# Patient Record
Sex: FEMALE | Race: WHITE
[De-identification: names, ages, dates, MRNs, and addresses within clinical notes are randomized per-mention and may not be internally consistent; named-entity substitution may affect disease eponyms.]

---

## 2020-07-16 ENCOUNTER — HOSPITAL ENCOUNTER (INPATIENT)
Dept: HOSPITAL 41 - JD.OB | Age: 25
LOS: 3 days | Discharge: HOME | DRG: 560 | End: 2020-07-19
Attending: OBSTETRICS & GYNECOLOGY | Admitting: OBSTETRICS & GYNECOLOGY
Payer: COMMERCIAL

## 2020-07-16 DIAGNOSIS — Z3A.37: ICD-10-CM

## 2020-07-16 DIAGNOSIS — Z11.59: ICD-10-CM

## 2020-07-16 DIAGNOSIS — Z88.0: ICD-10-CM

## 2020-07-16 DIAGNOSIS — O34.219: ICD-10-CM

## 2020-07-16 PROCEDURE — U0002 COVID-19 LAB TEST NON-CDC: HCPCS

## 2020-07-16 RX ADMIN — Medication SCH MLS/HR: at 20:56

## 2020-07-16 NOTE — PCM.LDHP
L&D History of Present Illness





- General


Date of Service: 20


Admit Problem/Dx: 


                   Patient Status Order with Admit Dx/Problem





20 19:16


Patient Status [ADT] Routine 








                           Admission Diagnosis/Problem











Admission Diagnosis/Problem    Pregnancy














Source of Information: Patient


History Limitations: Reports: No Limitations





- History of Present Illness


Introduction:: 





Patient is a 23 y/o  at 37 3/7 wks who presents for IOL for gestational 

HTN.  Last pregnancy affected by IUGR and severe preeclampsia.  Had a 36 wk IOL 

where did not progress past 1 cm and ended in PLTCS.  Growth scans normal this 

pregnancy, but at 36 wks noted to have intermittent mild range BP's.  Labs 

normal and so set for IOL/TOLAC today.  Doing well.  Having some contractions.  

No bleeding or LOF.  








- Related Data


Allergies/Adverse Reactions: 


                                    Allergies











Allergy/AdvReac Type Severity Reaction Status Date / Time


 


Penicillins Allergy  Other Verified 20 18:06











Home Medications: 


                                    Home Meds





Albuterol [Proair HFA] 2 puff INH ASDIRECTED 20 [History]


Prenatal Vits #93/Iron Fum/FA [Prenatal Formula Tablet] 1 each PO DAILY 20

[History]


Ferrous Sulfate [Iron] 325 mg PO DAILY 20 [History]


Nitrofurantoin Monohyd/M-Cryst [Macrobid 100 mg Capsule] 100 mg PO Q12HR 0

20 [History]











Past Medical History


Respiratory History: Reports: Asthma


OB/GYN History: Reports: Pregnancy


: 2


Para: 1


LMP (Approximate): Pregnant





- Past Surgical History


HEENT Surgical History: Reports: Oral Surgery


Female  Surgical History: Reports:  Section, Cervical Cryotherapy





Social & Family History





- Tobacco Use


Smoking Status *Q: Never Smoker





- Alcohol Use


Alcohol Use History: No





- Recreational Drug Use


Recreational Drug Use: No





H&P Review of Systems





- Review of Systems:


Review Of Systems: See Below


General: Reports: No Symptoms


Pulmonary: Reports: No Symptoms


Cardiovascular: Reports: No Symptoms


Gastrointestinal: Reports: Abdominal Pain (rare contractions )


Genitourinary: Reports: No Symptoms


Musculoskeletal: Reports: No Symptoms


Psychiatric: Reports: No Symptoms


Neurological: Reports: No Symptoms





L&D Exam





- Exam


Exam: See Below





- Vital Signs


Weight: 90.9 kg





- OB Specific


Contraction Intensity: Mild


Fetal Movement: Active


Fetal Heart Tones: Present


Fetal Heart Tones per Min: 135


Fetal Heart Rate (FHR) Variability: Moderate (6-25 bmp)


Birth Presentation: Vertex





- Wagner Score


Wagner Score Cervix Position: Midposition


Wagner Score Consistency: Soft


Wagner Score Effacement: 51-70%


Wagner Score Dilation: 1-2 cm


Wagner Score Infant's Station: -2


Wagner Score Total: 7





- Exam


General: Alert, Oriented, Cooperative


Lungs: Clear to Auscultation, Normal Respiratory Effort


Cardiovascular: Regular Rate, Regular Rhythm


GI/Abdominal Exam: Soft, Non-Tender


Genitourinary: Normal external exam


Extremities: Normal Inspection


Skin: Warm, Dry, Intact





- Patient Data


Lab Results Last 24 hrs: 


                         Laboratory Results - last 24 hr











  20 Range/Units





  19:35 


 


WBC  8.53  (3.98-10.04)  K/mm3


 


RBC  4.14  (3.98-5.22)  M/mm3


 


Hgb  11.3  (11.2-15.7)  gm/dl


 


Hct  35.5  (34.1-44.9)  %


 


MCV  85.7  (79.4-94.8)  fl


 


MCH  27.3  (25.6-32.2)  pg


 


MCHC  31.8 L  (32.2-35.5)  g/dl


 


RDW Std Deviation  55.1 H  (36.4-46.3)  fL


 


Plt Count  254  (182-369)  K/mm3


 


MPV  9.4  (9.4-12.3)  fl


 


Neut % (Auto)  76.2 H  (34.0-71.1)  %


 


Lymph % (Auto)  14.8 L  (19.3-51.7)  %


 


Mono % (Auto)  7.9  (4.7-12.5)  %


 


Eos % (Auto)  0.6 L  (0.7-5.8)  


 


Baso % (Auto)  0.1  (0.1-1.2)  %


 


Neut # (Auto)  6.51 H  (1.56-6.13)  K/mm3


 


Lymph # (Auto)  1.26  (1.18-3.74)  K/mm3


 


Mono # (Auto)  0.67 H  (0.24-0.36)  K/mm3


 


Eos # (Auto)  0.05  (0.04-0.36)  K/mm3


 


Baso # (Auto)  0.01  (0.01-0.08)  K/mm3











Result Diagrams: 


                                 20 19:35








- Problem List


(1) 37 weeks gestation of pregnancy


SNOMED Code(s): 04000808


   ICD Code: Z3A.37 - 37 WEEKS GESTATION OF PREGNANCY   Status: Acute   Current 

Visit: Yes   





(2) Gestational hypertension


SNOMED Code(s): 653231482


   ICD Code: O13.9 - GESTATIONAL HTN W/O SIGNIFICANT PROTEINURIA, UNSP TRIMESTER

   Status: Acute   Current Visit: Yes   


Qualifiers: 


   Trimester: third trimester   Qualified Code(s): O13.3 - Gestational 

[pregnancy-induced] hypertension without significant proteinuria, third 

trimester   





(3) Gestational diabetes, diet controlled


SNOMED Code(s): 47435093, 606227691, 972012200


   ICD Code: O24.410 - GESTATIONAL DIABETES MELLITUS IN PREGNANCY, DIET CONTROLL

ED   Status: Acute   Current Visit: Yes   


Qualifiers: 


   Trimester: third trimester   Qualified Code(s): O24.410 - Gestational 

diabetes mellitus in pregnancy, diet controlled   





(4) History of  delivery


SNOMED Code(s): 925904771


   ICD Code: Z98.891 - HISTORY OF UTERINE SCAR FROM PREVIOUS SURGERY   Status: 

Acute   Current Visit: Yes   


Problem List Initiated/Reviewed/Updated: Yes


Orders Last 24hrs: 


                               Active Orders 24 hr











 Category Date Time Status


 


 Patient Status [ADT] Routine ADT  20 19:16 Active


 


 Activity as Tolerated [RC] PFP Care  20 19:16 Active


 


 Communication Order [RC] ASDIRECTED Care  20 19:16 Active


 


 Fetal Heart Tones [RC] ASDIRECTED Care  20 19:17 Active


 


 Fetal Non Stress Test [RC] PER UNIT ROUTINE Care  20 19:16 Active


 


 Notify Provider [RC] PFP Care  20 19:16 Active


 


 Notify Provider [RC] PRN Care  20 19:16 Active


 


 Peripheral IV Care [RC] .AS DIRECTED Care  20 19:17 Active


 


 Vital Signs [RC] PER UNIT ROUTINE Care  20 19:16 Active


 


 Regular Diet [DIET] Diet  20 Breakfast Active


 


 ALANINE AMINOTRANSFERASE,ALT [CHEM] Routine Lab  20 19:48 Ordered


 


 ASPARTATE AMNIOTRANSFERASE,AST [CHEM] Routine Lab  20 19:48 Ordered


 


 CREATININE W/GFR [CHEM] Routine Lab  20 19:48 Ordered


 


 PROTEIN/CREATININE RATIO,URINE [URCHEM] Routine Lab  20 19:48 Ordered


 


 RAPID PLASMA REAGIN,RPR [CHEM] Routine Lab  20 19:35 Received


 


 TYPE AND SCREEN [BBK] Routine Lab  20 19:48 Ordered


 


 Acetaminophen [Tylenol] Med  20 19:16 Active





 650 mg PO Q4H PRN   


 


 Calcium Carbonate [Tums] Med  20 19:16 Active





 1,000 mg PO Q2H PRN   


 


 Lactated Ringers [Ringers, Lactated] 1,000 ml Med  20 19:30 Active





 IV ASDIRECTED   


 


 Nalbuphine [Nubain] Med  20 19:16 Active





 10 mg IVPUSH Q2H PRN   


 


 Ondansetron [Zofran] Med  20 19:16 Active





 4 mg IVPUSH Q4H PRN   


 


 Oxytocin/Lactated Ringers [Pitocin in LR 10 Units/1,000 Med  20 19:30 

Active





 ML]   





 10 unit in 1,000 ml IV .CONTINUOUS   


 


 Oxytocin/Lactated Ringers [Pitocin in LR 10 Units/1,000 Med  20 19:30 

Active





 ML]   





 10 unit in 1,000 ml IV TITRATE   


 


 Sodium Chloride 0.9% [Saline Flush] Med  20 19:16 Active





 10 ml FLUSH ASDIRECTED PRN   


 


 Electronic Fetal Heart Tones Ext w TOCO [WOMSER] Oth  20 19:16 Ordered





 Routine   


 


 Electronic Fetal Heart Tones Internal [WOMSER] Per Unit Ot  20 19:16 

Ordered





 Routine   


 


 Peripheral IV Insertion Adult [OM.PC] Routine Oth  20 19:16 Ordered


 


 Resuscitation Status Routine Resus Stat  20 19:16 Ordered








                                Medication Orders





Acetaminophen (Tylenol)  650 mg PO Q4H PRN


   PRN Reason: Pain (Mild 1-3) and fever


Calcium Carbonate/Glycine (Tums)  1,000 mg PO Q2H PRN


   PRN Reason: Indigestion


Oxytocin/Lactated Ringer's (Pitocin In Lr 10 Units/1,000 Ml)  10 unit in 1,000 

mls @ 12 mls/hr IV TITRATE HEAVEN; Protocol


Oxytocin/Lactated Ringer's (Pitocin In Lr 10 Units/1,000 Ml)  10 unit in 1,000 

mls @ 500 mls/hr IV .CONTINUOUS HEAVEN


Lactated Ringer's (Ringers, Lactated)  1,000 mls @ 100 mls/hr IV ASDIRECTED HEAVEN


Nalbuphine HCl (Nubain)  10 mg IVPUSH Q2H PRN


   PRN Reason: Pain


Ondansetron HCl (Zofran)  4 mg IVPUSH Q4H PRN


   PRN Reason: Nausea/Vomiting


Sodium Chloride (Saline Flush)  10 ml FLUSH ASDIRECTED PRN


   PRN Reason: Keep Vein Open








Assessment/Plan Comment:: 





Labs including AST, ALT, Creatinine, urine protein to creatinine ratio to be 

done


Blood sugars q4 in early labor, q2 in active 


Reis bulb placed.  Pitocin to be started.  AROM when able 


GBS negative, no need for antibiotics 


Pain management per patient preference 


Anticipate

## 2020-07-17 PROCEDURE — 0KQM0ZZ REPAIR PERINEUM MUSCLE, OPEN APPROACH: ICD-10-PCS | Performed by: OBSTETRICS & GYNECOLOGY

## 2020-07-17 PROCEDURE — 3E0R3BZ INTRODUCTION OF ANESTHETIC AGENT INTO SPINAL CANAL, PERCUTANEOUS APPROACH: ICD-10-PCS | Performed by: OBSTETRICS & GYNECOLOGY

## 2020-07-17 PROCEDURE — 00HU33Z INSERTION OF INFUSION DEVICE INTO SPINAL CANAL, PERCUTANEOUS APPROACH: ICD-10-PCS | Performed by: OBSTETRICS & GYNECOLOGY

## 2020-07-17 PROCEDURE — 10H07YZ INSERTION OF OTHER DEVICE INTO PRODUCTS OF CONCEPTION, VIA NATURAL OR ARTIFICIAL OPENING: ICD-10-PCS | Performed by: OBSTETRICS & GYNECOLOGY

## 2020-07-17 PROCEDURE — 10907ZC DRAINAGE OF AMNIOTIC FLUID, THERAPEUTIC FROM PRODUCTS OF CONCEPTION, VIA NATURAL OR ARTIFICIAL OPENING: ICD-10-PCS | Performed by: OBSTETRICS & GYNECOLOGY

## 2020-07-17 RX ADMIN — Medication PRN ML: at 03:10

## 2020-07-17 RX ADMIN — Medication PRN ML: at 15:14

## 2020-07-17 RX ADMIN — WITCH HAZEL PRN TUB: 500 SOLUTION RECTAL; TOPICAL at 17:25

## 2020-07-17 RX ADMIN — Medication SCH MLS/HR: at 14:29

## 2020-07-17 RX ADMIN — Medication PRN ML: at 09:18

## 2020-07-17 NOTE — PCM.DEL
L & D Note





- General Info


Date of Service: 20





- Delivery Note


Labor: Induced by ARM, Induced by Oxytocin


Cervical Ripening Method: Balloon Device


Delivery Outcome: Livebirth


Infant Delivery Method: Spontaneous Vaginal Delivery-Single


Infant Delivery Mode: Spontaneous


Birth Presentation: Right Occiput Anterior (YOVANI)


Nuchal Cord: None


Anesthesia Type: Epidural


Amniotic Fluid Description: Clear


Episiotomy Type: None


Laceration: 2nd Degree, Sulcus


Suture type: Vicryl


Suture size: 2-0


Placenta: Intact, Spontaneous


Cord: 3 Vessels


Estimated Blood Loss: 400


Resuscitation Needed: Yes


: Bulb Syringe, Stimulated, Warmed, Montgomery Used, Warmer Used


Delivery Comments (Free Text/Narrative):: 





Patient found to be complete and began pushing.  With maternal pushing effort 

fetal head delivered from YOVANI presentation.  No nuchal cord present.  With 

gentle downward traction the fetal shoulders and body delivered.  Infant placed 

on maternal abdomen.  Cord clamped and cut.  Cord blood obtained.  Placenta 

allowed time to separate and expelled intact.  Very brisk bleeding noted 

throughout this time which was found to be a deep sulcus tear on patient's 

right.  Suction used to visualize site and hemostasis obtained with running, 

locked 2-0 vicryl suture.  Next small perineal laceration closed with a running 

2-0 vicryl as was a split in the right labia.  





- General Info


Date of Service: 20





- Patient Data


Vitals - Most Recent: 


                                Last Vital Signs











Temp  36.7 C   20 19:16


 


Pulse  113 H  20 19:16


 


Resp  14   20 19:16


 


BP  138/100 H  20 19:16


 


Pulse Ox  97   20 19:16











Weight - Most Recent: 90.9 kg


I&O - Last 24 Hours: 


                                 Intake & Output











 20





 06:59 14:59 22:59


 


Intake Total 1000 1000 


 


Balance 1000 1000 











Lab Results Last 24 Hours: 


                         Laboratory Results - last 24 hr











  20 Range/Units





  19:35 19:35 19:35 


 


WBC  8.53    (3.98-10.04)  K/mm3


 


RBC  4.14    (3.98-5.22)  M/mm3


 


Hgb  11.3    (11.2-15.7)  gm/dl


 


Hct  35.5    (34.1-44.9)  %


 


MCV  85.7    (79.4-94.8)  fl


 


MCH  27.3    (25.6-32.2)  pg


 


MCHC  31.8 L    (32.2-35.5)  g/dl


 


RDW Std Deviation  55.1 H    (36.4-46.3)  fL


 


Plt Count  254    (182-369)  K/mm3


 


MPV  9.4    (9.4-12.3)  fl


 


Neut % (Auto)  76.2 H    (34.0-71.1)  %


 


Lymph % (Auto)  14.8 L    (19.3-51.7)  %


 


Mono % (Auto)  7.9    (4.7-12.5)  %


 


Eos % (Auto)  0.6 L    (0.7-5.8)  


 


Baso % (Auto)  0.1    (0.1-1.2)  %


 


Neut # (Auto)  6.51 H    (1.56-6.13)  K/mm3


 


Lymph # (Auto)  1.26    (1.18-3.74)  K/mm3


 


Mono # (Auto)  0.67 H    (0.24-0.36)  K/mm3


 


Eos # (Auto)  0.05    (0.04-0.36)  K/mm3


 


Baso # (Auto)  0.01    (0.01-0.08)  K/mm3


 


Creatinine   0.7   (0.55-1.02)  mg/dL


 


Est Cr Clr Drug Dosing   110.55   mL/min


 


Estimated GFR (MDRD)   > 60   (>60)  mL/min


 


POC Glucose     ()  mg/dL


 


AST   26   (15-37)  U/L


 


ALT   25   (14-59)  U/L


 


Ur Random Creatinine     (30.0-125.0)  mg/dL


 


U Random Total Protein     (0.0-11.8)  mg/dL


 


Protein/Creatinin Ratio     (0-149)  mg/g


 


COVID-19 ([facility])     (NEGATIVE)  


 


Blood Type    A POSITIVE  


 


Gel Antibody Screen    Negative  














  20 Range/Units





  21:05 21:12 23:00 


 


WBC     (3.98-10.04)  K/mm3


 


RBC     (3.98-5.22)  M/mm3


 


Hgb     (11.2-15.7)  gm/dl


 


Hct     (34.1-44.9)  %


 


MCV     (79.4-94.8)  fl


 


MCH     (25.6-32.2)  pg


 


MCHC     (32.2-35.5)  g/dl


 


RDW Std Deviation     (36.4-46.3)  fL


 


Plt Count     (182-369)  K/mm3


 


MPV     (9.4-12.3)  fl


 


Neut % (Auto)     (34.0-71.1)  %


 


Lymph % (Auto)     (19.3-51.7)  %


 


Mono % (Auto)     (4.7-12.5)  %


 


Eos % (Auto)     (0.7-5.8)  


 


Baso % (Auto)     (0.1-1.2)  %


 


Neut # (Auto)     (1.56-6.13)  K/mm3


 


Lymph # (Auto)     (1.18-3.74)  K/mm3


 


Mono # (Auto)     (0.24-0.36)  K/mm3


 


Eos # (Auto)     (0.04-0.36)  K/mm3


 


Baso # (Auto)     (0.01-0.08)  K/mm3


 


Creatinine     (0.55-1.02)  mg/dL


 


Est Cr Clr Drug Dosing     mL/min


 


Estimated GFR (MDRD)     (>60)  mL/min


 


POC Glucose   76   ()  mg/dL


 


AST     (15-37)  U/L


 


ALT     (14-59)  U/L


 


Ur Random Creatinine    187.8 H  (30.0-125.0)  mg/dL


 


U Random Total Protein    20.6 H  (0.0-11.8)  mg/dL


 


Protein/Creatinin Ratio    109.7  (0-149)  mg/g


 


COVID-19 ([facility])  Negative    (NEGATIVE)  


 


Blood Type     


 


Gel Antibody Screen     














  20 Range/Units





  00:02 03:37 08:36 


 


WBC     (3.98-10.04)  K/mm3


 


RBC     (3.98-5.22)  M/mm3


 


Hgb     (11.2-15.7)  gm/dl


 


Hct     (34.1-44.9)  %


 


MCV     (79.4-94.8)  fl


 


MCH     (25.6-32.2)  pg


 


MCHC     (32.2-35.5)  g/dl


 


RDW Std Deviation     (36.4-46.3)  fL


 


Plt Count     (182-369)  K/mm3


 


MPV     (9.4-12.3)  fl


 


Neut % (Auto)     (34.0-71.1)  %


 


Lymph % (Auto)     (19.3-51.7)  %


 


Mono % (Auto)     (4.7-12.5)  %


 


Eos % (Auto)     (0.7-5.8)  


 


Baso % (Auto)     (0.1-1.2)  %


 


Neut # (Auto)     (1.56-6.13)  K/mm3


 


Lymph # (Auto)     (1.18-3.74)  K/mm3


 


Mono # (Auto)     (0.24-0.36)  K/mm3


 


Eos # (Auto)     (0.04-0.36)  K/mm3


 


Baso # (Auto)     (0.01-0.08)  K/mm3


 


Creatinine     (0.55-1.02)  mg/dL


 


Est Cr Clr Drug Dosing     mL/min


 


Estimated GFR (MDRD)     (>60)  mL/min


 


POC Glucose  80  84  80  ()  mg/dL


 


AST     (15-37)  U/L


 


ALT     (14-59)  U/L


 


Ur Random Creatinine     (30.0-125.0)  mg/dL


 


U Random Total Protein     (0.0-11.8)  mg/dL


 


Protein/Creatinin Ratio     (0-149)  mg/g


 


COVID-19 ([facility])     (NEGATIVE)  


 


Blood Type     


 


Gel Antibody Screen     














  20 Range/Units





  13:09 


 


WBC   (3.98-10.04)  K/mm3


 


RBC   (3.98-5.22)  M/mm3


 


Hgb   (11.2-15.7)  gm/dl


 


Hct   (34.1-44.9)  %


 


MCV   (79.4-94.8)  fl


 


MCH   (25.6-32.2)  pg


 


MCHC   (32.2-35.5)  g/dl


 


RDW Std Deviation   (36.4-46.3)  fL


 


Plt Count   (182-369)  K/mm3


 


MPV   (9.4-12.3)  fl


 


Neut % (Auto)   (34.0-71.1)  %


 


Lymph % (Auto)   (19.3-51.7)  %


 


Mono % (Auto)   (4.7-12.5)  %


 


Eos % (Auto)   (0.7-5.8)  


 


Baso % (Auto)   (0.1-1.2)  %


 


Neut # (Auto)   (1.56-6.13)  K/mm3


 


Lymph # (Auto)   (1.18-3.74)  K/mm3


 


Mono # (Auto)   (0.24-0.36)  K/mm3


 


Eos # (Auto)   (0.04-0.36)  K/mm3


 


Baso # (Auto)   (0.01-0.08)  K/mm3


 


Creatinine   (0.55-1.02)  mg/dL


 


Est Cr Clr Drug Dosing   mL/min


 


Estimated GFR (MDRD)   (>60)  mL/min


 


POC Glucose  103  ()  mg/dL


 


AST   (15-37)  U/L


 


ALT   (14-59)  U/L


 


Ur Random Creatinine   (30.0-125.0)  mg/dL


 


U Random Total Protein   (0.0-11.8)  mg/dL


 


Protein/Creatinin Ratio   (0-149)  mg/g


 


COVID-19 ([facility])   (NEGATIVE)  


 


Blood Type   


 


Gel Antibody Screen   











Med Orders - Current: 


                               Current Medications





Acetaminophen (Tylenol)  650 mg PO Q4H PRN


   PRN Reason: Pain (Mild 1-3) and fever


Calcium Carbonate/Glycine (Tums)  1,000 mg PO Q2H PRN


   PRN Reason: Indigestion


   Last Admin: 20 14:27 Dose:  1,000 mg


   Documented by: 


Diphenhydramine HCl (Benadryl)  25 mg IVPUSH Q6H PRN


   PRN Reason: pruritis


Ephedrine Sulfate (Ephedrine Sulfate)  5 mg IVPUSH ASDIRECTED PRN


   PRN Reason: Hypotension


Fentanyl (Sublimaze)  100 mcg EPIDUR Q3H PRN


   PRN Reason: Pain


   Last Admin: 20 03:10 Dose:  100 mcg


   Documented by: 


Fentanyl/Bupivacaine HCl (Fentanyl/Bupivacaine/Ns 2 Mcg-0.125% 100 Ml)  100 ml 

EPIDUR ASDIRECTED PRN


   PRN Reason: Pain


   Last Admin: 20 15:14 Dose:  100 ml


   Documented by: 


Oxytocin/Lactated Ringer's (Pitocin In Lr 10 Units/1,000 Ml)  10 unit in 1,000 

mls @ 12 mls/hr IV TITRATE HEAVEN; Protocol


   Last Admin: 20 14:29 Dose:  20 munits/min, 120 mls/hr


   Documented by: 


Oxytocin/Lactated Ringer's (Pitocin In Lr 10 Units/1,000 Ml)  10 unit in 1,000 

mls @ 500 mls/hr IV .CONTINUOUS HEAVEN


Lactated Ringer's (Ringers, Lactated)  1,000 mls @ 100 mls/hr IV ASDIRECTED HEAVEN


   Last Admin: 20 04:56 Dose:  100 mls/hr


   Documented by: 


Nalbuphine HCl (Nubain)  10 mg IVPUSH Q2H PRN


   PRN Reason: Pain


   Last Admin: 20 00:11 Dose:  10 mg


   Documented by: 


Ondansetron HCl (Zofran)  4 mg IVPUSH Q4H PRN


   PRN Reason: Nausea/Vomiting


Sodium Chloride (Saline Flush)  10 ml FLUSH ASDIRECTED PRN


   PRN Reason: Keep Vein Open





Discontinued Medications





Lidocaine HCl (Xylocaine 1%)  50 ml INJECT ONETIME ONE


   Stop: 20 19:17


Misoprostol (Cytotec) Confirm Administered Dose 600 mcg .ROUTE .STK-MED ONE


   Stop: 20 16:05


Misoprostol (Cytotec)  600 mcg PO NOW STA


   Stop: 20 16:29











- Problem List & Annotations


(1) 37 weeks gestation of pregnancy


SNOMED Code(s): 06301784


   Code(s): Z3A.37 - 37 WEEKS GESTATION OF PREGNANCY   Status: Acute   Current 

Visit: Yes   





(2) Gestational hypertension


SNOMED Code(s): 898835710


   Code(s): O13.9 - GESTATIONAL HTN W/O SIGNIFICANT PROTEINURIA, UNSP TRIMESTER 

 Status: Acute   Current Visit: Yes   


Qualifiers: 


   Trimester: third trimester   Qualified Code(s): O13.3 - Gestational 

[pregnancy-induced] hypertension without significant proteinuria, third 

trimester   





(3) Gestational diabetes, diet controlled


SNOMED Code(s): 47340753, 055959496, 366012726


   Code(s): O24.410 - GESTATIONAL DIABETES MELLITUS IN PREGNANCY, DIET 

CONTROLLED   Status: Acute   Current Visit: Yes   


Qualifiers: 


   Trimester: third trimester   Qualified Code(s): O24.410 - Gestational 

diabetes mellitus in pregnancy, diet controlled   





(4) History of  delivery


SNOMED Code(s): 831801417


   Code(s): Z98.891 - HISTORY OF UTERINE SCAR FROM PREVIOUS SURGERY   Status: 

Acute   Current Visit: Yes   





(5) Vaginal birth after 


SNOMED Code(s): 702202103


   Code(s): O34.219 - MATERNAL CARE FOR UNSP TYPE SCAR FROM PREVIOUS  

DEL   Status: Acute   Current Visit: Yes   





- Problem List Review


Problem List Initiated/Reviewed/Updated: Yes





- My Orders


Last 24 Hours: 


My Active Orders





20 19:16


Patient Status [ADT] Routine 


Activity as Tolerated [RC] PFP 


Communication Order [RC] ASDIRECTED 


Notify Provider [RC] PFP 


Notify Provider [RC] PRN 


Vital Signs [RC] PER UNIT ROUTINE 


Acetaminophen [Tylenol]   650 mg PO Q4H PRN 


Calcium Carbonate [Tums]   1,000 mg PO Q2H PRN 


Nalbuphine [Nubain]   10 mg IVPUSH Q2H PRN 


Ondansetron [Zofran]   4 mg IVPUSH Q4H PRN 


Sodium Chloride 0.9% [Saline Flush]   10 ml FLUSH ASDIRECTED PRN 


Electronic Fetal Heart Tones Ext w TOCO [WOMSER] Routine 


Electronic Fetal Heart Tones Internal [WOMSER] Per Unit Routine 


Peripheral IV Insertion Adult [OM.PC] Routine 


Resuscitation Status Routine 





20 19:17


Fetal Heart Tones [RC] ASDIRECTED 


Peripheral IV Care [RC] .AS DIRECTED 





20 19:30


Lactated Ringers [Ringers, Lactated] 1,000 ml IV ASDIRECTED 


Oxytocin/Lactated Ringers [Pitocin in LR 10 Units/1,000 ML] 10 unit in 1,000 ml 

IV .CONTINUOUS 


Oxytocin/Lactated Ringers [Pitocin in LR 10 Units/1,000 ML] 10 unit in 1,000 ml 

IV TITRATE 





20 19:35


RAPID PLASMA REAGIN,RPR [CHEM] Routine 





20 19:56


Blood Glucose Check, Bedside [RC] Q4H 





20 Breakfast


Regular Diet [DIET] 





20 16:28


Patient Status Manage Transfer [TRANSFER] Routine 














- Assessment


Assessment:: 





PPD#0





- Plan


Plan:: 








Routine cares 


Breast feeding


Fasting blood sugar in am .  2hr GTT at 6 weeks postpartum 


Monitor BP's closely 


Discharge home in 1-2 days

## 2020-07-17 NOTE — PCM.PNLD
Labor Progress Note





- VS & Meds


Vital Signs: 


                                Last Vital Signs











Temp  36.7 C   07/16/20 19:16


 


Pulse  113 H  07/16/20 19:16


 


Resp  14   07/16/20 19:16


 


BP  138/100 H  07/16/20 19:16


 


Pulse Ox  97   07/16/20 19:16











Active Medications: 


                               Current Medications





Acetaminophen (Tylenol)  650 mg PO Q4H PRN


   PRN Reason: Pain (Mild 1-3) and fever


Calcium Carbonate/Glycine (Tums)  1,000 mg PO Q2H PRN


   PRN Reason: Indigestion


   Last Admin: 07/17/20 03:10 Dose:  1,000 mg


   Documented by: 


Diphenhydramine HCl (Benadryl)  25 mg IVPUSH Q6H PRN


   PRN Reason: pruritis


Ephedrine Sulfate (Ephedrine Sulfate)  5 mg IVPUSH ASDIRECTED PRN


   PRN Reason: Hypotension


Fentanyl (Sublimaze)  100 mcg EPIDUR Q3H PRN


   PRN Reason: Pain


   Last Admin: 07/17/20 03:10 Dose:  100 mcg


   Documented by: 


Fentanyl/Bupivacaine HCl (Fentanyl/Bupivacaine/Ns 2 Mcg-0.125% 100 Ml)  100 ml 

EPIDUR ASDIRECTED PRN


   PRN Reason: Pain


   Last Admin: 07/17/20 03:10 Dose:  100 ml


   Documented by: 


Oxytocin/Lactated Ringer's (Pitocin In Lr 10 Units/1,000 Ml)  10 unit in 1,000 

mls @ 12 mls/hr IV TITRATE HEAVEN; Protocol


   Last Titration: 07/17/20 06:25 Dose:  11 munits/min, 66 mls/hr


   Documented by: 


Oxytocin/Lactated Ringer's (Pitocin In Lr 10 Units/1,000 Ml)  10 unit in 1,000 

mls @ 500 mls/hr IV .CONTINUOUS HEVAEN


Lactated Ringer's (Ringers, Lactated)  1,000 mls @ 100 mls/hr IV ASDIRECTED HEAVEN


   Last Admin: 07/17/20 04:56 Dose:  100 mls/hr


   Documented by: 


Nalbuphine HCl (Nubain)  10 mg IVPUSH Q2H PRN


   PRN Reason: Pain


   Last Admin: 07/17/20 00:11 Dose:  10 mg


   Documented by: 


Ondansetron HCl (Zofran)  4 mg IVPUSH Q4H PRN


   PRN Reason: Nausea/Vomiting


Sodium Chloride (Saline Flush)  10 ml FLUSH ASDIRECTED PRN


   PRN Reason: Keep Vein Open





Discontinued Medications





Lidocaine HCl (Xylocaine 1%)  50 ml INJECT ONETIME ONE


   Stop: 07/16/20 19:17











- Uterine Contractions


Uterine Monitoring Mode: External [place]


Contraction Intensity: Moderate


Uterine Resting Tone: Soft





- Fetal Monitoring


Fetal Monitor Mode: External Ultrasound


Fetal Heart Rate (FHR) Baseline: 120


Fetal Heart Rate (FHR) Variability: Moderate (6-25 bmp)


Fetal Accelerations: Present, 15x15


Fetal Decelerations: None


Fetal Strip Review: Category I





- Vaginal Exam


Dilation (cm): 5


Effacement (Percent): 90


Station: -1


Cervical Position: Midposition





- Labor Progress (Free Text)


Labor Progress: 





Doing well.  On 5 of pitocin.  IUPC placed.  Pitocin at 5.  Continue to titrate 

per protocol

## 2020-07-17 NOTE — PCM.PNLD
Labor Progress Note





- VS & Meds


Vital Signs: 


                                Last Vital Signs











Temp  36.7 C   20 19:16


 


Pulse  113 H  20 19:16


 


Resp  14   20 19:16


 


BP  138/100 H  20 19:16


 


Pulse Ox  97   20 19:16











Active Medications: 


                               Current Medications





Acetaminophen (Tylenol)  650 mg PO Q4H PRN


   PRN Reason: Pain (Mild 1-3) and fever


Calcium Carbonate/Glycine (Tums)  1,000 mg PO Q2H PRN


   PRN Reason: Indigestion


   Last Admin: 20 03:10 Dose:  1,000 mg


   Documented by: 


Diphenhydramine HCl (Benadryl)  25 mg IVPUSH Q6H PRN


   PRN Reason: pruritis


Ephedrine Sulfate (Ephedrine Sulfate)  5 mg IVPUSH ASDIRECTED PRN


   PRN Reason: Hypotension


Fentanyl (Sublimaze)  100 mcg EPIDUR Q3H PRN


   PRN Reason: Pain


   Last Admin: 20 03:10 Dose:  100 mcg


   Documented by: 


Fentanyl/Bupivacaine HCl (Fentanyl/Bupivacaine/Ns 2 Mcg-0.125% 100 Ml)  100 ml 

EPIDUR ASDIRECTED PRN


   PRN Reason: Pain


   Last Admin: 20 09:18 Dose:  100 ml


   Documented by: 


Oxytocin/Lactated Ringer's (Pitocin In Lr 10 Units/1,000 Ml)  10 unit in 1,000 

mls @ 12 mls/hr IV TITRATE HEAVEN; Protocol


   Last Titration: 20 10:30 Dose:  16 munits/min, 96 mls/hr


   Documented by: 


Oxytocin/Lactated Ringer's (Pitocin In Lr 10 Units/1,000 Ml)  10 unit in 1,000 

mls @ 500 mls/hr IV .CONTINUOUS HEAVEN


Lactated Ringer's (Ringers, Lactated)  1,000 mls @ 100 mls/hr IV ASDIRECTED HEAVEN


   Last Admin: 20 04:56 Dose:  100 mls/hr


   Documented by: 


Nalbuphine HCl (Nubain)  10 mg IVPUSH Q2H PRN


   PRN Reason: Pain


   Last Admin: 20 00:11 Dose:  10 mg


   Documented by: 


Ondansetron HCl (Zofran)  4 mg IVPUSH Q4H PRN


   PRN Reason: Nausea/Vomiting


Sodium Chloride (Saline Flush)  10 ml FLUSH ASDIRECTED PRN


   PRN Reason: Keep Vein Open





Discontinued Medications





Lidocaine HCl (Xylocaine 1%)  50 ml INJECT ONETIME ONE


   Stop: 20 19:17











- Uterine Contractions


Uterine Monitoring Mode: IUPC


Contraction Intensity: Moderate to Strong


Uterine Resting Tone: Soft





- Fetal Monitoring


Fetal Monitor Mode: External Ultrasound


Fetal Heart Rate (FHR) Baseline: 140


Fetal Heart Rate (FHR) Variability: Moderate (6-25 bmp)


Fetal Accelerations: Present, 15x15


Fetal Decelerations: None


Fetal Strip Review: Category I





- Vaginal Exam


Dilation (cm): 8


Effacement (Percent): 100


Station: 0


Cervical Position: Anterior





- Labor Progress (Free Text)


Labor Progress: 





Doing well.  Pitocin at 20.  Contractions not yet adequate, but making change.  

Will continue to increase per protocol.  Anticipate

## 2020-07-18 RX ADMIN — WITCH HAZEL PRN TUB: 500 SOLUTION RECTAL; TOPICAL at 18:14

## 2020-07-18 NOTE — PCM.DCSUM1
**Discharge Summary





- Hospital Course


Free Text/Narrative:: 





 [place] ** LIVE **


                                        


                                        


                                        





                                L/D Delivery Note





Patient Name: MARLENE SOL             Medical Record Number: [id number]


Date of Birth: [birth year]                                Patient Status: Inpatient


Attending Provider: Kamala Stephen                 Account Number: [id number]


Date: 20 16:30                         Initialization Date: 20 16:30








L & D Note





- General Info


Date of Service: 20





- Delivery Note


Labor: Induced by ARM, Induced by Oxytocin


Cervical Ripening Method: Balloon Device


Delivery Outcome: Livebirth


Infant Delivery Method: Spontaneous Vaginal Delivery-Single


Infant Delivery Mode: Spontaneous


Birth Presentation: Right Occiput Anterior (YOVANI)


Nuchal Cord: None


Anesthesia Type: Epidural


Amniotic Fluid Description: Clear


Episiotomy Type: None


Laceration: 2nd Degree, Sulcus


Suture type: Vicryl


Suture size: 2-0


Placenta: Intact, Spontaneous


Cord: 3 Vessels


Estimated Blood Loss: 400


Resuscitation Needed: Yes


: Bulb Syringe, Stimulated, Warmed, [place] Used, Warmer Used


Delivery Comments (Free Text/Narrative):: 





Patient found to be complete and began pushing.  With maternal pushing effort 

fetal head delivered from YOVANI presentation.  No nuchal cord present.  With 

gentle downward traction the fetal shoulders and body delivered.  Infant placed 

on maternal abdomen.  Cord clamped and cut.  Cord blood obtained.  Placenta 

allowed time to separate and expelled intact.  Very brisk bleeding noted 

throughout this time which was found to be a deep sulcus tear on patient's 

right.  Suction used to visualize site and hemostasis obtained with running, 

locked 2-0 vicryl suture.  Next small perineal laceration closed with a running 

2-0 vicryl as was a split in the right labia.  





- General Info


Date of Service: 20





- Patient Data


Vitals - Most Recent: 


                                Last Vital Signs











Temp  36.7 C   20 19:16


 


Pulse  113 H  20 19:16


 


Resp  14   20 19:16


 


BP  138/100 H  20 19:16


 


Pulse Ox  97   20 19:16











Weight - Most Recent: 90.9 kg


I&O - Last 24 Hours: 


                                 Intake & Output











 20





 06:59 14:59 22:59


 


Intake Total 1000 1000 


 


Balance 1000 1000 











Lab Results Last 24 Hours: 


                         Laboratory Results - last 24 hr











  20 Range/Units





  19:35 19:35 19:35 


 


WBC  8.53    (3.98-10.04)  K/mm3


 


RBC  4.14    (3.98-5.22)  M/mm3


 


Hgb  11.3    (11.2-15.7)  gm/dl


 


Hct  35.5    (34.1-44.9)  %


 


MCV  85.7    (79.4-94.8)  fl


 


MCH  27.3    (25.6-32.2)  pg


 


MCHC  31.8 L    (32.2-35.5)  g/dl


 


RDW Std Deviation  55.1 H    (36.4-46.3)  fL


 


Plt Count  254    (182-369)  K/mm3


 


MPV  9.4    (9.4-12.3)  fl


 


Neut % (Auto)  76.2 H    (34.0-71.1)  %


 


Lymph % (Auto)  14.8 L    (19.3-51.7)  %


 


Mono % (Auto)  7.9    (4.7-12.5)  %


 


Eos % (Auto)  0.6 L    (0.7-5.8)  


 


Baso % (Auto)  0.1    (0.1-1.2)  %


 


Neut # (Auto)  6.51 H    (1.56-6.13)  K/mm3


 


Lymph # (Auto)  1.26    (1.18-3.74)  K/mm3


 


Mono # (Auto)  0.67 H    (0.24-0.36)  K/mm3


 


Eos # (Auto)  0.05    (0.04-0.36)  K/mm3


 


Baso # (Auto)  0.01    (0.01-0.08)  K/mm3


 


Creatinine   0.7   (0.55-1.02)  mg/dL


 


Est Cr Clr Drug Dosing   110.55   mL/min


 


Estimated GFR (MDRD)   > 60   (>60)  mL/min


 


POC Glucose     ()  mg/dL


 


AST   26   (15-37)  U/L


 


ALT   25   (14-59)  U/L


 


Ur Random Creatinine     (30.0-125.0)  mg/dL


 


U Random Total Protein     (0.0-11.8)  mg/dL


 


Protein/Creatinin Ratio     (0-149)  mg/g


 


COVID-19 ([facility])     (NEGATIVE)  


 


Blood Type    A POSITIVE  


 


Gel Antibody Screen    Negative  














  20 Range/Units





  21:05 21:12 23:00 


 


WBC     (3.98-10.04)  K/mm3


 


RBC     (3.98-5.22)  M/mm3


 


Hgb     (11.2-15.7)  gm/dl


 


Hct     (34.1-44.9)  %


 


MCV     (79.4-94.8)  fl


 


MCH     (25.6-32.2)  pg


 


MCHC     (32.2-35.5)  g/dl


 


RDW Std Deviation     (36.4-46.3)  fL


 


Plt Count     (182-369)  K/mm3


 


MPV     (9.4-12.3)  fl


 


Neut % (Auto)     (34.0-71.1)  %


 


Lymph % (Auto)     (19.3-51.7)  %


 


Mono % (Auto)     (4.7-12.5)  %


 


Eos % (Auto)     (0.7-5.8)  


 


Baso % (Auto)     (0.1-1.2)  %


 


Neut # (Auto)     (1.56-6.13)  K/mm3


 


Lymph # (Auto)     (1.18-3.74)  K/mm3


 


Mono # (Auto)     (0.24-0.36)  K/mm3


 


Eos # (Auto)     (0.04-0.36)  K/mm3


 


Baso # (Auto)     (0.01-0.08)  K/mm3


 


Creatinine     (0.55-1.02)  mg/dL


 


Est Cr Clr Drug Dosing     mL/min


 


Estimated GFR (MDRD)     (>60)  mL/min


 


POC Glucose   76   ()  mg/dL


 


AST     (15-37)  U/L


 


ALT     (14-59)  U/L


 


Ur Random Creatinine    187.8 H  (30.0-125.0)  mg/dL


 


U Random Total Protein    20.6 H  (0.0-11.8)  mg/dL


 


Protein/Creatinin Ratio    109.7  (0-149)  mg/g


 


COVID-19 ([facility])  Negative    (NEGATIVE)  


 


Blood Type     


 


Gel Antibody Screen     














  20 Range/Units





  00:02 03:37 08:36 


 


WBC     (3.98-10.04)  K/mm3


 


RBC     (3.98-5.22)  M/mm3


 


Hgb     (11.2-15.7)  gm/dl


 


Hct     (34.1-44.9)  %


 


MCV     (79.4-94.8)  fl


 


MCH     (25.6-32.2)  pg


 


MCHC     (32.2-35.5)  g/dl


 


RDW Std Deviation     (36.4-46.3)  fL


 


Plt Count     (182-369)  K/mm3


 


MPV     (9.4-12.3)  fl


 


Neut % (Auto)     (34.0-71.1)  %


 


Lymph % (Auto)     (19.3-51.7)  %


 


Mono % (Auto)     (4.7-12.5)  %


 


Eos % (Auto)     (0.7-5.8)  


 


Baso % (Auto)     (0.1-1.2)  %


 


Neut # (Auto)     (1.56-6.13)  K/mm3


 


Lymph # (Auto)     (1.18-3.74)  K/mm3


 


Mono # (Auto)     (0.24-0.36)  K/mm3


 


Eos # (Auto)     (0.04-0.36)  K/mm3


 


Baso # (Auto)     (0.01-0.08)  K/mm3


 


Creatinine     (0.55-1.02)  mg/dL


 


Est Cr Clr Drug Dosing     mL/min


 


Estimated GFR (MDRD)     (>60)  mL/min


 


POC Glucose  80  84  80  ()  mg/dL


 


AST     (15-37)  U/L


 


ALT     (14-59)  U/L


 


Ur Random Creatinine     (30.0-125.0)  mg/dL


 


U Random Total Protein     (0.0-11.8)  mg/dL


 


Protein/Creatinin Ratio     (0-149)  mg/g


 


COVID-19 ([facility])     (NEGATIVE)  


 


Blood Type     


 


Gel Antibody Screen     














  20 Range/Units





  13:09 


 


WBC   (3.98-10.04)  K/mm3


 


RBC   (3.98-5.22)  M/mm3


 


Hgb   (11.2-15.7)  gm/dl


 


Hct   (34.1-44.9)  %


 


MCV   (79.4-94.8)  fl


 


MCH   (25.6-32.2)  pg


 


MCHC   (32.2-35.5)  g/dl


 


RDW Std Deviation   (36.4-46.3)  fL


 


Plt Count   (182-369)  K/mm3


 


MPV   (9.4-12.3)  fl


 


Neut % (Auto)   (34.0-71.1)  %


 


Lymph % (Auto)   (19.3-51.7)  %


 


Mono % (Auto)   (4.7-12.5)  %


 


Eos % (Auto)   (0.7-5.8)  


 


Baso % (Auto)   (0.1-1.2)  %


 


Neut # (Auto)   (1.56-6.13)  K/mm3


 


Lymph # (Auto)   (1.18-3.74)  K/mm3


 


Mono # (Auto)   (0.24-0.36)  K/mm3


 


Eos # (Auto)   (0.04-0.36)  K/mm3


 


Baso # (Auto)   (0.01-0.08)  K/mm3


 


Creatinine   (0.55-1.02)  mg/dL


 


Est Cr Clr Drug Dosing   mL/min


 


Estimated GFR (MDRD)   (>60)  mL/min


 


POC Glucose  103  ()  mg/dL


 


AST   (15-37)  U/L


 


ALT   (14-59)  U/L


 


Ur Random Creatinine   (30.0-125.0)  mg/dL


 


U Random Total Protein   (0.0-11.8)  mg/dL


 


Protein/Creatinin Ratio   (0-149)  mg/g


 


COVID-19 ([facility])   (NEGATIVE)  


 


Blood Type   


 


Gel Antibody Screen   











Med Orders - Current: 


                               Current Medications





Acetaminophen (Tylenol)  650 mg PO Q4H PRN


   PRN Reason: Pain (Mild 1-3) and fever


Calcium Carbonate/Glycine (Tums)  1,000 mg PO Q2H PRN


   PRN Reason: Indigestion


   Last Admin: 20 14:27 Dose:  1,000 mg


   Documented by: 


Diphenhydramine HCl (Benadryl)  25 mg IVPUSH Q6H PRN


   PRN Reason: pruritis


Ephedrine Sulfate (Ephedrine Sulfate)  5 mg IVPUSH ASDIRECTED PRN


   PRN Reason: Hypotension


Fentanyl (Sublimaze)  100 mcg EPIDUR Q3H PRN


   PRN Reason: Pain


   Last Admin: 20 03:10 Dose:  100 mcg


   Documented by: 


Fentanyl/Bupivacaine HCl (Fentanyl/Bupivacaine/Ns 2 Mcg-0.125% 100 Ml)  100 ml 

EPIDUR ASDIRECTED PRN


   PRN Reason: Pain


   Last Admin: 20 15:14 Dose:  100 ml


   Documented by: 


Oxytocin/Lactated Ringer's (Pitocin In Lr 10 Units/1,000 Ml)  10 unit in 1,000 

mls @ 12 mls/hr IV TITRATE HEAVEN; Protocol


   Last Admin: 20 14:29 Dose:  20 munits/min, 120 mls/hr


   Documented by: 


Oxytocin/Lactated Ringer's (Pitocin In Lr 10 Units/1,000 Ml)  10 unit in 1,000 

mls @ 500 mls/hr IV .CONTINUOUS HEAVEN


Lactated Ringer's (Ringers, Lactated)  1,000 mls @ 100 mls/hr IV ASDIRECTED HEAVEN


   Last Admin: 20 04:56 Dose:  100 mls/hr


   Documented by: 


Nalbuphine HCl (Nubain)  10 mg IVPUSH Q2H PRN


   PRN Reason: Pain


   Last Admin: 20 00:11 Dose:  10 mg


   Documented by: 


Ondansetron HCl (Zofran)  4 mg IVPUSH Q4H PRN


   PRN Reason: Nausea/Vomiting


Sodium Chloride (Saline Flush)  10 ml FLUSH ASDIRECTED PRN


   PRN Reason: Keep Vein Open





Discontinued Medications





Lidocaine HCl (Xylocaine 1%)  50 ml INJECT ONETIME ONE


   Stop: 20 19:17


Misoprostol (Cytotec) Confirm Administered Dose 600 mcg .ROUTE .STK-MED ONE


   Stop: 20 16:05


Misoprostol (Cytotec)  600 mcg PO NOW STA


   Stop: 20 16:29











- Problem List & Annotations


(1) 37 weeks gestation of pregnancy


SNOMED Code(s): 60346440


   Code(s): Z3A.37 - 37 WEEKS GESTATION OF PREGNANCY   Status: Acute   Current 

Visit: Yes   





(2) Gestational hypertension


SNOMED Code(s): 712505552


   Code(s): O13.9 - GESTATIONAL HTN W/O SIGNIFICANT PROTEINURIA, UNSP TRIMESTER 

 Status: Acute   Current Visit: Yes   


Qualifiers: 


   Trimester: third trimester   Qualified Code(s): O13.3 - Gestational 

[pregnancy-induced] hypertension without significant proteinuria, third 

trimester   





(3) Gestational diabetes, diet controlled


SNOMED Code(s): 22316651, 867476230, 964163319


   Code(s): O24.410 - GESTATIONAL DIABETES MELLITUS IN PREGNANCY, DIET 

CONTROLLED   Status: Acute   Current Visit: Yes   


Qualifiers: 


   Trimester: third trimester   Qualified Code(s): O24.410 - Gestational 

diabetes mellitus in pregnancy, diet controlled   





(4) History of  delivery


SNOMED Code(s): 082796241


   Code(s): Z98.891 - HISTORY OF UTERINE SCAR FROM PREVIOUS SURGERY   Status: 

Acute   Current Visit: Yes   





(5) Vaginal birth after 


SNOMED Code(s): 147884463


   Code(s): O34.219 - MATERNAL CARE FOR UNSP TYPE SCAR FROM PREVIOUS  

DEL   Status: Acute   Current Visit: Yes   





- Problem List Review


Problem List Initiated/Reviewed/Updated: Yes





- My Orders


Last 24 Hours: 


My Active Orders





20 19:16


Patient Status [ADT] Routine 


Activity as Tolerated [RC] PFP 


Communication Order [RC] ASDIRECTED 


Notify Provider [RC] PFP 


Notify Provider [RC] PRN 


Vital Signs [RC] PER UNIT ROUTINE 


Acetaminophen [Tylenol]   650 mg PO Q4H PRN 


Calcium Carbonate [Tums]   1,000 mg PO Q2H PRN 


Nalbuphine [Nubain]   10 mg IVPUSH Q2H PRN 


Ondansetron [Zofran]   4 mg IVPUSH Q4H PRN 


Sodium Chloride 0.9% [Saline Flush]   10 ml FLUSH ASDIRECTED PRN 


Electronic Fetal Heart Tones Ext w TOCO [WOMSER] Routine 


Electronic Fetal Heart Tones Internal [WOMSER] Per Unit Routine 


Peripheral IV Insertion Adult [OM.PC] Routine 


Resuscitation Status Routine 





20 19:17


Fetal Heart Tones [RC] ASDIRECTED 


Peripheral IV Care [RC] .AS DIRECTED 





20 19:30


Lactated Ringers [Ringers, Lactated] 1,000 ml IV ASDIRECTED 


Oxytocin/Lactated Ringers [Pitocin in LR 10 Units/1,000 ML] 10 unit in 1,000 ml 

IV .CONTINUOUS 


Oxytocin/Lactated Ringers [Pitocin in LR 10 Units/1,000 ML] 10 unit in 1,000 ml 

IV TITRATE 





20 19:35


RAPID PLASMA REAGIN,RPR [CHEM] Routine 





20 19:56


Blood Glucose Check, Bedside [RC] Q4H 





20 Breakfast


Regular Diet [DIET] 





20 16:28


Patient Status Manage Transfer [TRANSFER] Routine 














- Assessment


Assessment:: 





PPD#0





- Plan


Plan:: 








Routine cares 


Breast feeding


Fasting blood sugar in am .  2hr GTT at 6 weeks postpartum 


Monitor BP's closely 


Discharge home in 1-2 days 








HPI Initial Comments: 





 [facility] ** LIVE **


                                        


                                        


                                        





                                L/D Delivery Note





Patient Name: MARLENE SOL             Medical Record Number: [id number]


Date of Birth: [birth year]                                Patient Status: Inpatient


Attending Provider: Kamala Stephen                 Account Number: [id number]


Date: 20 16:30                         Initialization Date: 20 16:30








L & D Note





- General Info


Date of Service: 20





- Delivery Note


Labor: Induced by ARM, Induced by Oxytocin


Cervical Ripening Method: Balloon Device


Delivery Outcome: Livebirth


Infant Delivery Method: Spontaneous Vaginal Delivery-Single


Infant Delivery Mode: Spontaneous


Birth Presentation: Right Occiput Anterior (YOVANI)


Nuchal Cord: None


Anesthesia Type: Epidural


Amniotic Fluid Description: Clear


Episiotomy Type: None


Laceration: 2nd Degree, Sulcus


Suture type: Vicryl


Suture size: 2-0


Placenta: Intact, Spontaneous


Cord: 3 Vessels


Estimated Blood Loss: 400


Resuscitation Needed: Yes


[place]: Bulb Syringe, Stimulated, Warmed, [place] Used, Warmer Used


Delivery Comments (Free Text/Narrative):: 





Patient found to be complete and began pushing.  With maternal pushing effort 

fetal head delivered from YOVANI presentation.  No nuchal cord present.  With 

gentle downward traction the fetal shoulders and body delivered.  Infant placed 

on maternal abdomen.  Cord clamped and cut.  Cord blood obtained.  Placenta 

allowed time to separate and expelled intact.  Very brisk bleeding noted 

throughout this time which was found to be a deep sulcus tear on patient's 

right.  Suction used to visualize site and hemostasis obtained with running, 

locked 2-0 vicryl suture.  Next small perineal laceration closed with a running 

2-0 vicryl as was a split in the right labia.  





- General Info


Date of Service: 20





- Patient Data


Vitals - Most Recent: 


                                Last Vital Signs











Temp  36.7 C   20 19:16


 


Pulse  113 H  20 19:16


 


Resp  14   20 19:16


 


BP  138/100 H  20 19:16


 


Pulse Ox  97   20 19:16











Weight - Most Recent: 90.9 kg


I&O - Last 24 Hours: 


                                 Intake & Output











 20





 06:59 14:59 22:59


 


Intake Total 1000 1000 


 


Balance 1000 1000 











Lab Results Last 24 Hours: 


                         Laboratory Results - last 24 hr











  20 Range/Units





  19:35 19:35 19:35 


 


WBC  8.53    (3.98-10.04)  K/mm3


 


RBC  4.14    (3.98-5.22)  M/mm3


 


Hgb  11.3    (11.2-15.7)  gm/dl


 


Hct  35.5    (34.1-44.9)  %


 


MCV  85.7    (79.4-94.8)  fl


 


MCH  27.3    (25.6-32.2)  pg


 


MCHC  31.8 L    (32.2-35.5)  g/dl


 


RDW Std Deviation  55.1 H    (36.4-46.3)  fL


 


Plt Count  254    (182-369)  K/mm3


 


MPV  9.4    (9.4-12.3)  fl


 


Neut % (Auto)  76.2 H    (34.0-71.1)  %


 


Lymph % (Auto)  14.8 L    (19.3-51.7)  %


 


Mono % (Auto)  7.9    (4.7-12.5)  %


 


Eos % (Auto)  0.6 L    (0.7-5.8)  


 


Baso % (Auto)  0.1    (0.1-1.2)  %


 


Neut # (Auto)  6.51 H    (1.56-6.13)  K/mm3


 


Lymph # (Auto)  1.26    (1.18-3.74)  K/mm3


 


Mono # (Auto)  0.67 H    (0.24-0.36)  K/mm3


 


Eos # (Auto)  0.05    (0.04-0.36)  K/mm3


 


Baso # (Auto)  0.01    (0.01-0.08)  K/mm3


 


Creatinine   0.7   (0.55-1.02)  mg/dL


 


Est Cr Clr Drug Dosing   110.55   mL/min


 


Estimated GFR (MDRD)   > 60   (>60)  mL/min


 


POC Glucose     ()  mg/dL


 


AST   26   (15-37)  U/L


 


ALT   25   (14-59)  U/L


 


Ur Random Creatinine     (30.0-125.0)  mg/dL


 


U Random Total Protein     (0.0-11.8)  mg/dL


 


Protein/Creatinin Ratio     (0-149)  mg/g


 


COVID-19 ([facility])     (NEGATIVE)  


 


Blood Type    A POSITIVE  


 


Gel Antibody Screen    Negative  














  20 Range/Units





  21:05 21:12 23:00 


 


WBC     (3.98-10.04)  K/mm3


 


RBC     (3.98-5.22)  M/mm3


 


Hgb     (11.2-15.7)  gm/dl


 


Hct     (34.1-44.9)  %


 


MCV     (79.4-94.8)  fl


 


MCH     (25.6-32.2)  pg


 


MCHC     (32.2-35.5)  g/dl


 


RDW Std Deviation     (36.4-46.3)  fL


 


Plt Count     (182-369)  K/mm3


 


MPV     (9.4-12.3)  fl


 


Neut % (Auto)     (34.0-71.1)  %


 


Lymph % (Auto)     (19.3-51.7)  %


 


Mono % (Auto)     (4.7-12.5)  %


 


Eos % (Auto)     (0.7-5.8)  


 


Baso % (Auto)     (0.1-1.2)  %


 


Neut # (Auto)     (1.56-6.13)  K/mm3


 


Lymph # (Auto)     (1.18-3.74)  K/mm3


 


Mono # (Auto)     (0.24-0.36)  K/mm3


 


Eos # (Auto)     (0.04-0.36)  K/mm3


 


Baso # (Auto)     (0.01-0.08)  K/mm3


 


Creatinine     (0.55-1.02)  mg/dL


 


Est Cr Clr Drug Dosing     mL/min


 


Estimated GFR (MDRD)     (>60)  mL/min


 


POC Glucose   76   ()  mg/dL


 


AST     (15-37)  U/L


 


ALT     (14-59)  U/L


 


Ur Random Creatinine    187.8 H  (30.0-125.0)  mg/dL


 


U Random Total Protein    20.6 H  (0.0-11.8)  mg/dL


 


Protein/Creatinin Ratio    109.7  (0-149)  mg/g


 


COVID-19 ([facility])  Negative    (NEGATIVE)  


 


Blood Type     


 


Gel Antibody Screen     














  20 Range/Units





  00:02 03:37 08:36 


 


WBC     (3.98-10.04)  K/mm3


 


RBC     (3.98-5.22)  M/mm3


 


Hgb     (11.2-15.7)  gm/dl


 


Hct     (34.1-44.9)  %


 


MCV     (79.4-94.8)  fl


 


MCH     (25.6-32.2)  pg


 


MCHC     (32.2-35.5)  g/dl


 


RDW Std Deviation     (36.4-46.3)  fL


 


Plt Count     (182-369)  K/mm3


 


MPV     (9.4-12.3)  fl


 


Neut % (Auto)     (34.0-71.1)  %


 


Lymph % (Auto)     (19.3-51.7)  %


 


Mono % (Auto)     (4.7-12.5)  %


 


Eos % (Auto)     (0.7-5.8)  


 


Baso % (Auto)     (0.1-1.2)  %


 


Neut # (Auto)     (1.56-6.13)  K/mm3


 


Lymph # (Auto)     (1.18-3.74)  K/mm3


 


Mono # (Auto)     (0.24-0.36)  K/mm3


 


Eos # (Auto)     (0.04-0.36)  K/mm3


 


Baso # (Auto)     (0.01-0.08)  K/mm3


 


Creatinine     (0.55-1.02)  mg/dL


 


Est Cr Clr Drug Dosing     mL/min


 


Estimated GFR (MDRD)     (>60)  mL/min


 


POC Glucose  80  84  80  ()  mg/dL


 


AST     (15-37)  U/L


 


ALT     (14-59)  U/L


 


Ur Random Creatinine     (30.0-125.0)  mg/dL


 


U Random Total Protein     (0.0-11.8)  mg/dL


 


Protein/Creatinin Ratio     (0-149)  mg/g


 


COVID-19 ([facility])     (NEGATIVE)  


 


Blood Type     


 


Gel Antibody Screen     














  20 Range/Units





  13:09 


 


WBC   (3.98-10.04)  K/mm3


 


RBC   (3.98-5.22)  M/mm3


 


Hgb   (11.2-15.7)  gm/dl


 


Hct   (34.1-44.9)  %


 


MCV   (79.4-94.8)  fl


 


MCH   (25.6-32.2)  pg


 


MCHC   (32.2-35.5)  g/dl


 


RDW Std Deviation   (36.4-46.3)  fL


 


Plt Count   (182-369)  K/mm3


 


MPV   (9.4-12.3)  fl


 


Neut % (Auto)   (34.0-71.1)  %


 


Lymph % (Auto)   (19.3-51.7)  %


 


Mono % (Auto)   (4.7-12.5)  %


 


Eos % (Auto)   (0.7-5.8)  


 


Baso % (Auto)   (0.1-1.2)  %


 


Neut # (Auto)   (1.56-6.13)  K/mm3


 


Lymph # (Auto)   (1.18-3.74)  K/mm3


 


Mono # (Auto)   (0.24-0.36)  K/mm3


 


Eos # (Auto)   (0.04-0.36)  K/mm3


 


Baso # (Auto)   (0.01-0.08)  K/mm3


 


Creatinine   (0.55-1.02)  mg/dL


 


Est Cr Clr Drug Dosing   mL/min


 


Estimated GFR (MDRD)   (>60)  mL/min


 


POC Glucose  103  ()  mg/dL


 


AST   (15-37)  U/L


 


ALT   (14-59)  U/L


 


Ur Random Creatinine   (30.0-125.0)  mg/dL


 


U Random Total Protein   (0.0-11.8)  mg/dL


 


Protein/Creatinin Ratio   (0-149)  mg/g


 


COVID-19 ([facility])   (NEGATIVE)  


 


Blood Type   


 


Gel Antibody Screen   











Med Orders - Current: 


                               Current Medications





Acetaminophen (Tylenol)  650 mg PO Q4H PRN


   PRN Reason: Pain (Mild 1-3) and fever


Calcium Carbonate/Glycine (Tums)  1,000 mg PO Q2H PRN


   PRN Reason: Indigestion


   Last Admin: 20 14:27 Dose:  1,000 mg


   Documented by: 


Diphenhydramine HCl (Benadryl)  25 mg IVPUSH Q6H PRN


   PRN Reason: pruritis


Ephedrine Sulfate (Ephedrine Sulfate)  5 mg IVPUSH ASDIRECTED PRN


   PRN Reason: Hypotension


Fentanyl (Sublimaze)  100 mcg EPIDUR Q3H PRN


   PRN Reason: Pain


   Last Admin: 20 03:10 Dose:  100 mcg


   Documented by: 


Fentanyl/Bupivacaine HCl (Fentanyl/Bupivacaine/Ns 2 Mcg-0.125% 100 Ml)  100 ml 

EPIDUR ASDIRECTED PRN


   PRN Reason: Pain


   Last Admin: 20 15:14 Dose:  100 ml


   Documented by: 


Oxytocin/Lactated Ringer's (Pitocin In Lr 10 Units/1,000 Ml)  10 unit in 1,000 

mls @ 12 mls/hr IV TITRATE HEAVEN; Protocol


   Last Admin: 20 14:29 Dose:  20 munits/min, 120 mls/hr


   Documented by: 


Oxytocin/Lactated Ringer's (Pitocin In Lr 10 Units/1,000 Ml)  10 unit in 1,000 

mls @ 500 mls/hr IV .CONTINUOUS HEAVEN


Lactated Ringer's (Ringers, Lactated)  1,000 mls @ 100 mls/hr IV ASDIRECTED HEAVEN


   Last Admin: 20 04:56 Dose:  100 mls/hr


   Documented by: 


Nalbuphine HCl (Nubain)  10 mg IVPUSH Q2H PRN


   PRN Reason: Pain


   Last Admin: 20 00:11 Dose:  10 mg


   Documented by: 


Ondansetron HCl (Zofran)  4 mg IVPUSH Q4H PRN


   PRN Reason: Nausea/Vomiting


Sodium Chloride (Saline Flush)  10 ml FLUSH ASDIRECTED PRN


   PRN Reason: Keep Vein Open





Discontinued Medications





Lidocaine HCl (Xylocaine 1%)  50 ml INJECT ONETIME ONE


   Stop: 20 19:17


Misoprostol (Cytotec) Confirm Administered Dose 600 mcg .ROUTE .STK-MED ONE


   Stop: 20 16:05


Misoprostol (Cytotec)  600 mcg PO NOW STA


   Stop: 20 16:29











- Problem List & Annotations


(1) 37 weeks gestation of pregnancy


SNOMED Code(s): 01699296


   Code(s): Z3A.37 - 37 WEEKS GESTATION OF PREGNANCY   Status: Acute   Current 

Visit: Yes   





(2) Gestational hypertension


SNOMED Code(s): 438891378


   Code(s): O13.9 - GESTATIONAL HTN W/O SIGNIFICANT PROTEINURIA, UNSP TRIMESTER 

  Status: Acute   Current Visit: Yes   


Qualifiers: 


   Trimester: third trimester   Qualified Code(s): O13.3 - Gestational 

[pregnancy-induced] hypertension without significant proteinuria, third 

trimester   





(3) Gestational diabetes, diet controlled


SNOMED Code(s): 29484678, 099715819, 399483246


   Code(s): O24.410 - GESTATIONAL DIABETES MELLITUS IN PREGNANCY, DIET 

CONTROLLED   Status: Acute   Current Visit: Yes   


Qualifiers: 


   Trimester: third trimester   Qualified Code(s): O24.410 - Gestational 

diabetes mellitus in pregnancy, diet controlled   





(4) History of  delivery


SNOMED Code(s): 780424275


   Code(s): Z98.891 - HISTORY OF UTERINE SCAR FROM PREVIOUS SURGERY   Status: 

Acute   Current Visit: Yes   





(5) Vaginal birth after 


SNOMED Code(s): 622059981


   Code(s): O34.219 - MATERNAL CARE FOR UNSP TYPE SCAR FROM PREVIOUS  

DEL   Status: Acute   Current Visit: Yes   





- Problem List Review


Problem List Initiated/Reviewed/Updated: Yes





- My Orders


Last 24 Hours: 


My Active Orders





20 19:16


Patient Status [ADT] Routine 


Activity as Tolerated [RC] PFP 


Communication Order [RC] ASDIRECTED 


Notify Provider [RC] PFP 


Notify Provider [RC] PRN 


Vital Signs [RC] PER UNIT ROUTINE 


Acetaminophen [Tylenol]   650 mg PO Q4H PRN 


Calcium Carbonate [Tums]   1,000 mg PO Q2H PRN 


Nalbuphine [Nubain]   10 mg IVPUSH Q2H PRN 


Ondansetron [Zofran]   4 mg IVPUSH Q4H PRN 


Sodium Chloride 0.9% [Saline Flush]   10 ml FLUSH ASDIRECTED PRN 


Electronic Fetal Heart Tones Ext w TOCO [WOMSER] Routine 


Electronic Fetal Heart Tones Internal [WOMSER] Per Unit Routine 


Peripheral IV Insertion Adult [OM.PC] Routine 


Resuscitation Status Routine 





20 19:17


Fetal Heart Tones [RC] ASDIRECTED 


Peripheral IV Care [RC] .AS DIRECTED 





20 19:30


Lactated Ringers [Ringers, Lactated] 1,000 ml IV ASDIRECTED 


Oxytocin/Lactated Ringers [Pitocin in LR 10 Units/1,000 ML] 10 unit in 1,000 ml 

IV .CONTINUOUS 


Oxytocin/Lactated Ringers [Pitocin in LR 10 Units/1,000 ML] 10 unit in 1,000 ml 

IV TITRATE 





20 19:35


RAPID PLASMA REAGIN,RPR [CHEM] Routine 





20 19:56


Blood Glucose Check, Bedside [RC] Q4H 





20 Breakfast


Regular Diet [DIET] 





20 16:28


Patient Status Manage Transfer [TRANSFER] Routine 














- Assessment


Assessment:: 





PPD#0





- Plan


Plan:: 








Routine cares 


Breast feeding


Fasting blood sugar in am .  2hr GTT at 6 weeks postpartum 


Monitor BP's closely 


Discharge home in 1-2 days 








Brief History: [facility], [place], operated by [facility] ** LIVE **.  L/D Delivery Note.  [facility] 

[facility] [id number].  Date of Birth: 

[birth year]Patient Status: Inpatient.  Attending Provider: Kamala Stephen 

Number: [id number].  Date: 20 16:30Initialization Date: 20 16:30. 

 L & D Note.  - General Info.  Date of Service: 20.  - Delivery Note.  

Labor: Induced by ARM, Induced by Oxytocin.  Cervical Ripening Method: Balloon 

Device.  Delivery Outcome: Livebirth.  Infant Delivery Method: Spontaneous 

Vaginal Delivery-Single.  Infant Delivery Mode: Spontaneous.  Birth 

Presentation: Right Occiput Anterior (YOVANI).  Nuchal Cord: None.  Anesthesia 

Type: Epidural.  Amniotic Fluid Description: Clear.  Episiotomy Type: None.  

Laceration: 2nd Degree, Sulcus.  Suture type: Vicryl.  Suture size: 2-0.  

Placenta: Intact, Spontaneous.  Cord: 3 Vessels.  Estimated Blood Loss: 400.  

Resuscitation Needed: Yes.  : Bulb Syringe, Stimulated, Warmed, [place] 

Used, Warmer Used.  Delivery Comments (Free Text/Narrative)::  Patient found to 

be complete and began pushing.  With maternal pushing effort fetal head 

delivered from YOVANI presentation.  No nuchal cord present.  With gentle downward 

traction the fetal shoulders and body delivered.  Infant placed on maternal 

abdomen.  Cord clamped and cut.  Cord blood obtained.  Placenta allowed time to 

separate and expelled intact.  Very brisk bleeding noted throughout this time 

which was found to be a deep sulcus tear on patient's right.  Suction used to 

visualize site and hemostasis obtained with running, locked 2-0 vicryl suture.  

Next small perineal laceration closed with a running 2-0 vicryl as was a split 

in the right labia.  - General Info.  Date of Service: 20.  - Patient 

Data.  Vitals - Most Recent:  Last Vital Signs.  Temp 36.7 C  20 19:16.  

Pulse 113 H 20 19:16.  Resp 14  20 19:16.  /100 H 20 

19:16.  Pulse Ox 97  20 19:16.  Weight - Most Recent: 90.9 kg.  I&O - Last

 24 Hours:  Intake & Output.  2007/.  06:5914:5922:59.  

Intake [id number].  [id number].  Lab Results Last 24 Hours:  Laboratory 

Results - last 24 hr.  2007/Range/Units.  19:3519:3519:35.  

WBC 8.53 (3.98-10.04)  K/mm3.  RBC 4.14 (3.98-5.22)  M/mm3.  Hgb 11.3 (11.2-

15.7)  gm/dl.  Hct 35.5 (34.1-44.9)  %.  MCV 85.7 (79.4-94.8)  fl.  MCH 27.3 

(25.6-32.2)  pg.  MCHC 31.8 L (32.2-35.5)  g/dl.  RDW Std Deviation 55.1 H 

(36.4-46.3)  fL.  Plt Count 254 (182-369)  K/mm3.  MPV 9.4 (9.4-12.3)  fl.  Neut

 % (Auto) 76.2 H (34.0-71.1)  %.  Lymph % (Auto) 14.8 L (19.3-51.7)  %.  Mono % 

(Auto) 7.9 (4.7-12.5)  %.  Eos % (Auto) 0.6 L (0.7-5.8).  Baso % (Auto) 0.1 

(0.1-1.2)  %.  Neut # (Auto) 6.51 H (1.56-6.13)  K/mm3.  Lymph # (Auto) 1.26 

(1.18-3.74)  K/mm3.  Mono # (Auto) 0.67 H (0.24-0.36)  K/mm3.  Eos # (Auto) 0.05

 (0.04-0.36)  K/mm3.  Baso # (Auto) 0.01 (0.01-0.08)  K/mm3.  Creatinine 0.7 

(0.55-1.02)  mg/dL.  Est Cr Clr Drug Dosing 110.55 mL/min.  Estimated GFR (MDRD)

 > 60 (>60)  mL/min.  POC Glucose ()  mg/dL.  AST 26 (15-37)  U/L.  ALT 25

 (14-59)  U/L.  Ur Random Creatinine (30.0-125.0)  mg/dL.  U Random Total 

Protein (0.0-11.8)  mg/dL.  Protein/Creatinin Ratio (0-149)  mg/g.  COVID-19 

(ASHISH) (NEGATIVE).  Blood Type A POSITIVE.  Gel Antibody Screen Negative.  

2007//Range/Units.  21:0521:1223:00.  WBC (3.98-10.04)  K/mm3.

  RBC (3.98-5.22)  M/mm3.  Hgb (11.2-15.7)  gm/dl.  Hct (34.1-44.9)  %.  MCV 

(79.4-94.8)  fl.  MCH (25.6-32.2)  pg.  MCHC (32.2-35.5)  g/dl.  RDW Std 

Deviation (36.4-46.3)  fL.  Plt Count (182-369)  K/mm3.  MPV (9.4-12.3)  fl.  

Neut % (Auto) (34.0-71.1)  %.  Lymph % (Auto) (19.3-51.7)  %.  Mono % (Auto) 

(4.7-12.5)  %.  Eos % (Auto) (0.7-5.8).  Baso % (Auto) (0.1-1.2)  %.  Neut # 

(Auto) (1.56-6.13)  K/mm3.  Lymph # (Auto) (1.18-3.74)  K/mm3.  Mono # (Auto) 

(0.24-0.36)  K/mm3.  Eos # (Auto) (0.04-0.36)  K/mm3.  Baso # (Auto) (0.01-0.08)

  K/mm3.  Creatinine (0.55-1.02)  mg/dL.  Est Cr Clr Drug Dosing mL/min.  

Estimated GFR (MDRD) (>60)  mL/min.  POC Glucose 76 ()  mg/dL.  AST (15-

37)  U/L.  ALT (14-59)  U/L.  Ur Random Creatinine 187.8 H (30.0-125.0)  mg/dL. 

 U Random Total Protein 20.6 H (0.0-11.8)  mg/dL.  Protein/Creatinin Ratio 109.7

 (0-149)  mg/g.  COVID-19 (ASHISH) Negative (NEGATIVE).  Blood Type.  Gel Antibody 

Screen.  2007//Range/Units.  00:0203:3708:36.  WBC (3.98-

10.04)  K/mm3.  RBC (3.98-5.22)  M/mm3.  Hgb (11.2-15.7)  gm/dl.  Hct (34.1-

44.9)  %.  MCV (79.4-94.8)  fl.  MCH (25.6-32.2)  pg.  MCHC (32.2-35.5)  g/dl.  

RDW Std Deviation (36.4-46.3)  fL.  Plt Count (182-369)  K/mm3.  MPV (9.4-12.3) 

 fl.  Neut % (Auto) (34.0-71.1)  %.  Lymph % (Auto) (19.3-51.7)  %.  Mono % 

(Auto) (4.7-12.5)  %.  Eos % (Auto) (0.7-5.8).  Baso % (Auto) (0.1-1.2)  %.  

Neut # (Auto) (1.56-6.13)  K/mm3.  Lymph # (Auto) (1.18-3.74)  K/mm3.  Mono # 

(Auto) (0.24-0.36)  K/mm3.  Eos # (Auto) (0.04-0.36)  K/mm3.  Baso # (Auto) 

(0.01-0.08)  K/mm3.  Creatinine (0.55-1.02)  mg/dL.  Est Cr Clr Drug Dosing 

mL/min.  Estimated GFR (MDRD) (>60)  mL/min.  POC Glucose 80 84 80 ()  

mg/dL.  AST (15-37)  U/L.  ALT (14-59)  U/L.  Ur Random Creatinine (30.0-125.0) 

 mg/dL.  U Random Total Protein (0.0-11.8)  mg/dL.  Protein/Creatinin Ratio (0-

149)  mg/g.  COVID-19 (ASHISH) (NEGATIVE).  Blood Type.  Gel Antibody Screen.  

20Range/Units.  13:09.  WBC (3.98-10.04)  K/mm3.  RBC (3.98-5.22)  M/mm3. 

 Hgb (11.2-15.7)  gm/dl.  Hct (34.1-44.9)  %.  MCV (79.4-94.8)  fl.  MCH (25.6-

32.2)  pg.  MCHC (32.2-35.5)  g/dl.  RDW Std Deviation (36.4-46.3)  fL.  Plt 

Count (182-369)  K/mm3.  MPV (9.4-12.3)  fl.  Neut % (Auto) (34.0-71.1)  %.  

Lymph % (Auto) (19.3-51.7)  %.  Mono % (Auto) (4.7-12.5)  %.  Eos % (Auto) (0.7-

5.8).  Baso % (Auto) (0.1-1.2)  %.  Neut # (Auto) (1.56-6.13)  K/mm3.  Lymph # 

(Auto) (1.18-3.74)  K/mm3.  Mono # (Auto) (0.24-0.36)  K/mm3.  Eos # (Auto) 

(0.04-0.36)  K/mm3.  Baso # (Auto) (0.01-0.08)  K/mm3.  Creatinine (0.55-1.02)  

mg/dL.  Est Cr Clr Drug Dosing mL/min.  Estimated GFR (MDRD) (>60)  mL/min.  POC

 Glucose 103 ()  mg/dL.  AST (15-37)  U/L.  ALT (14-59)  U/L.  Ur Random 

Creatinine (30.0-125.0)  mg/dL.  U Random Total Protein (0.0-11.8)  mg/dL.  

Protein/Creatinin Ratio (0-149)  mg/g.  COVID-19 (ASHISH) (NEGATIVE).  Blood Type. 

 Gel Antibody Screen.  Med Orders - Current:  Current Medications.  

Acetaminophen (Tylenol)  650 mg PO Q4H PRN.  PRN Reason: Pain (Mild 1-3) and 

fever.  Calcium Carbonate/Glycine (Tums)  1,000 mg PO Q2H PRN.  PRN Reason: 

Indigestion.  Last Admin: 20 14:27 Dose:  1,000 mg.  Documented by:  

Diphenhydramine HCl (Benadryl)  25 mg IVPUSH Q6H PRN.  PRN Reason: pruritis.  

Ephedrine Sulfate (Ephedrine Sulfate)  5 mg IVPUSH ASDIRECTED PRN.  PRN Reason: 

Hypotension.  Fentanyl (Sublimaze)  100 mcg EPIDUR Q3H PRN.  PRN Reason: Pain.  

Last Admin: 20 03:10 Dose:  100 mcg.  Documented by:  Fentanyl/Bupivacaine

 HCl (Fentanyl/Bupivacaine/Ns 2 Mcg-0.125% 100 Ml)  100 ml EPIDUR ASDIRECTED 

PRN.  PRN Reason: Pain.  Last Admin: 20 15:14 Dose:  100 ml.  Documented 

by:  Oxytocin/Lactated Ringer's (Pitocin In Lr 10 Units/1,000 Ml)  10 unit in 

1,000 mls @ 12 mls/hr IV TITRATE HEAVEN; Protocol.  Last Admin: 20 14:29 

Dose:  20 munits/min, 120 mls/hr.  Documented by:  Oxytocin/Lactated Ringer's 

(Pitocin In Lr 10 Units/1,000 Ml)  10 unit in 1,000 mls @ 500 mls/hr IV 

.CONTINUOUS HEAVEN.  Lactated Ringer's (Ringers, Lactated)  1,000 mls @ 100 mls/hr 

IV ASDIRECTED HEAVEN.  Last Admin: 20 04:56 Dose:  100 mls/hr.  Documented 

by:  Nalbuphine HCl (Nubain)  10 mg IVPUSH Q2H PRN.  PRN Reason: Pain.  Last 

Admin: 20 00:11 Dose:  10 mg.  Documented by:  Ondansetron HCl (Zofran)  4

 mg IVPUSH Q4H PRN.  PRN Reason: Nausea/Vomiting.  Sodium Chloride (Saline 

Flush)  10 ml FLUSH ASDIRECTED PRN.  PRN Reason: Keep Vein Open.  Discontinued 

Medications.  Lidocaine HCl (Xylocaine 1%)  50 ml INJECT ONETIME ONE.  Stop: 

20 19:17.  Misoprostol (Cytotec) Confirm Administered Dose 600 mcg .ROUTE 

.STK-MED ONE.  Stop: 20 16:05.  Misoprostol (Cytotec)  600 mcg PO NOW STA.

  Stop: 20 16:29.  - Problem List & Annotations.  (1) 37 weeks gestation 

of pregnancy.  SNOMED Code(s): 27151589.  Code(s): Z3A.37 - 37 WEEKS GESTATION 

OF PREGNANCY   Status: Acute   Current Visit: Yes.  (2) Gestational 

hypertension.  SNOMED Code(s): 915359345.  Code(s): O13.9 - GESTATIONAL HTN W/O 

SIGNIFICANT PROTEINURIA, UNSP TRIMESTER   Status: Acute   Current Visit: Yes.  

Qualifiers:  Trimester: third trimester   Qualified Code(s): O13.3 - Gestational

 [pregnancy-induced] hypertension without significant proteinuria, third 

trimester.  (3) Gestational diabetes, diet controlled.  SNOMED Code(s): 

84028234, 972527464, 691577130.  Code(s): O24.410 - GESTATIONAL DIABETES 

MELLITUS IN PREGNANCY, DIET CONTROLLED   Status: Acute   Current Visit: Yes.  

Qualifiers:  Trimester: third trimester   Qualified Code(s): O24.410 - 

Gestational diabetes mellitus in pregnancy, diet controlled.  (4) History of 

 delivery.  SNOMED Code(s): 426518792.  Code(s): Z98.891 - HISTORY OF 

UTERINE SCAR FROM PREVIOUS SURGERY   Status: Acute   Current Visit: Yes.  (5) 

Vaginal birth after .  SNOMED Code(s): 549495922.  Code(s): O34.219 - 

MATERNAL CARE FOR UNSP TYPE SCAR FROM PREVIOUS  DEL   Status: Acute   

Current Visit: Yes.  - Problem List Review.  Problem List 

Initiated/Reviewed/Updated: Yes.  - My Orders.  Last 24 Hours:  My Active 

Orders.  20 19:16.  Patient Status [ADT] Routine.  Activity as Tolerated 

[RC] PFP.  Communication Order [RC] ASDIRECTED.  Notify Provider [RC] PFP.  

Notify Provider [RC] PRN.  Vital Signs [RC] PER UNIT ROUTINE.  Acetaminophen 

[Tylenol]   650 mg PO Q4H PRN.  Calcium Carbonate [Tums]   1,000 mg PO Q2H PRN. 

 Nalbuphine [Nubain]   10 mg IVPUSH Q2H PRN.  Ondansetron [Zofran]   4 mg IVPUSH

 Q4H PRN.  Sodium Chloride 0.9% [Saline Flush]   10 ml FLUSH ASDIRECTED PRN.  

Electronic Fetal Heart Tones Ext w TOCO [WOMSER] Routine.  Electronic Fetal 

Heart Tones Internal [WOMSER] Per Unit Routine.  Peripheral IV Insertion Adult 

[OM.PC] Routine.  Resuscitation Status Routine.  20 19:17.  Fetal Heart 

Tones [RC] ASDIRECTED.  Peripheral IV Care [RC] .AS DIRECTED.  20 19:30.  

Lactated Ringers [Ringers, Lactated] 1,000 ml IV ASDIRECTED.  Oxytocin/Lactated 

Ringers [Pitocin in LR 10 Units/1,000 ML] 10 unit in 1,000 ml IV .CONTINUOUS.  

Oxytocin/Lactated Ringers [Pitocin in LR 10 Units/1,000 ML] 10 unit in 1,000 ml 

IV TITRATE.  20 19:35.  RAPID PLASMA REAGIN,RPR [CHEM] Routine.  20 

19:56.  Blood Glucose Check, Bedside [RC] Q4H.  20 Breakfast.  Regular 

Diet [DIET].  20 16:28.  Patient Status Manage Transfer [TRANSFER] 

Routine.  - Assessment.  Assessment::  PPD#0.  - Plan.  Plan::  Routine cares.  

Breast feeding.  Fasting blood sugar in am .  2hr GTT at 6 weeks postpartum.  

Monitor BP's closely.  Discharge home in 1-2 days


Diagnosis: Stroke: No





- Discharge Data


Discharge Date: 20


Discharge Disposition: Home, Self-Care 01


Condition: Good





- Referral to Home Health


Primary Care Physician: 


Kamala Stephen MD








- Discharge Diagnosis/Problem(s)


(1) 37 weeks gestation of pregnancy


SNOMED Code(s): 20180085


   ICD Code: Z3A.37 - 37 WEEKS GESTATION OF PREGNANCY   Status: Acute   Current 

Visit: Yes   





(2) Gestational diabetes, diet controlled


SNOMED Code(s): 25295003, 075466822, 172197479


   ICD Code: O24.410 - GESTATIONAL DIABETES MELLITUS IN PREGNANCY, DIET 

CONTROLLED   Status: Acute   Current Visit: Yes   


Qualifiers: 


   Trimester: third trimester   Qualified Code(s): O24.410 - Gestational 

diabetes mellitus in pregnancy, diet controlled   





(3) Gestational hypertension


SNOMED Code(s): 827199666


   ICD Code: O13.9 - GESTATIONAL HTN W/O SIGNIFICANT PROTEINURIA, UNSP TRIMESTER

   Status: Acute   Current Visit: Yes   


Qualifiers: 


   Trimester: third trimester   Qualified Code(s): O13.3 - Gestational 

[pregnancy-induced] hypertension without significant proteinuria, third 

trimester   





(4) History of  delivery


SNOMED Code(s): 010303298


   ICD Code: Z98.891 - HISTORY OF UTERINE SCAR FROM PREVIOUS SURGERY   Status: 

Acute   Current Visit: Yes   





(5) Vaginal birth after 


SNOMED Code(s): 429257003


   ICD Code: O34.219 - MATERNAL CARE FOR UNSP TYPE SCAR FROM PREVIOUS  

DEL   Status: Acute   Current Visit: Yes   





- Patient Summary/Data


Complications: none


Consults: 


none


Hospital Course: 





uneventful





- Patient Instructions


Diet: Usual Diet as Tolerated


Driving: Do Not Drive (x48 hrs)


Showering/Bathing: May Shower


Notify Provider of: Fever, Increased Pain, Swelling and Redness, Drainage, Syd

sea and/or Vomiting





- Discharge Plan


*PRESCRIPTION DRUG MONITORING PROGRAM REVIEWED*: Not Applicable


*COPY OF PRESCRIPTION DRUG MONITORING REPORT IN PATIENT RAJNI: Not Applicable


Home Medications: 


                                    Home Meds





Albuterol [Proair HFA] 2 puff INH ASDIRECTED 20 [History]


Prenatal Vits #93/Iron Fum/FA [Prenatal Formula Tablet] 1 each PO DAILY 20

[History]


Ferrous Sulfate [Iron] 325 mg PO DAILY 20 [History]


Acetaminophen [Tylenol] 650 mg PO Q6H PRN  tablet 20 [Rx]


Docusate Sodium [Colace] 100 mg PO BID PRN  cap 20 [Rx]


Ibuprofen [Motrin] 600 mg PO Q6H PRN  tablet 20 [Rx]


witch hazeL [Tucks] 1 pad TOP ASDIRECTED PRN  pad 20 [Rx]








Referrals: 


Kamala Stephen MD [Primary Care Provider] -  (has follow up appointment)





- Discharge Summary/Plan Comment


DC Time >30 min.: No





- Patient Data


Vitals - Most Recent: 


                                Last Vital Signs











Temp  97.9 F   20 07:55


 


Pulse  77   20 07:55


 


Resp  14   20 07:55


 


BP  126/63   20 07:55


 


Pulse Ox  99   20 07:55











Weight - Most Recent: 200 lb 6.4 oz


I&O - Last 24 hours: 


                                 Intake & Output











 20





 22:59 06:59 14:59


 


Intake Total 3440  


 


Balance 3440  











Lab Results - Last 24 hrs: 


                         Laboratory Results - last 24 hr











  20 Range/Units





  19:35 13:09 07:59 


 


POC Glucose   103  92  ()  mg/dL


 


RPR  Non-reactive    (NONREACTIVE)  











Med Orders - Current: 


                               Current Medications





Acetaminophen (Tylenol)  650 mg PO Q4H PRN


   PRN Reason: mild pain or fever


   Last Admin: 20 07:54 Dose:  650 mg


   Documented by: 


Benzocaine/Menthol (Dermoplast Pain Relief Spray)  0 gm TOP ASDIRECTED PRN


   PRN Reason: Perineal Comfort Measure


   Last Admin: 20 17:26 Dose:  1 can


   Documented by: 


Docusate Sodium (Colace)  100 mg PO BID PRN


   PRN Reason: Constipation


   Last Admin: 20 20:17 Dose:  100 mg


   Documented by: 


Ibuprofen (Motrin)  600 mg PO Q6H PRN


   PRN Reason: Mild pain or fever


   Last Admin: 20 04:33 Dose:  600 mg


   Documented by: 


Witch Hazel (Tucks)  1 pad TOP ASDIRECTED PRN


   PRN Reason: Perineal Comfort Measure


   Last Admin: 20 17:25 Dose:  1 tub


   Documented by: 





Discontinued Medications





Acetaminophen (Tylenol)  650 mg PO Q4H PRN


   PRN Reason: Pain (Mild 1-3) and fever


Calcium Carbonate/Glycine (Tums)  1,000 mg PO Q2H PRN


   PRN Reason: Indigestion


   Last Admin: 20 14:27 Dose:  1,000 mg


   Documented by: 


Diphenhydramine HCl (Benadryl)  25 mg IVPUSH Q6H PRN


   PRN Reason: pruritis


Ephedrine Sulfate (Ephedrine Sulfate)  5 mg IVPUSH ASDIRECTED PRN


   PRN Reason: Hypotension


Fentanyl (Sublimaze)  100 mcg EPIDUR Q3H PRN


   PRN Reason: Pain


   Last Admin: 20 03:10 Dose:  100 mcg


   Documented by: 


Fentanyl/Bupivacaine HCl (Fentanyl/Bupivacaine/Ns 2 Mcg-0.125% 100 Ml)  100 ml 

EPIDUR ASDIRECTED PRN


   PRN Reason: Pain


   Last Admin: 20 15:14 Dose:  100 ml


   Documented by: 


Oxytocin/Lactated Ringer's (Pitocin In Lr 10 Units/1,000 Ml)  10 unit in 1,000 

mls @ 12 mls/hr IV TITRATE HEAVEN; Protocol


   Last Titration: 20 16:30 Dose:  41.67 munits/min, 250 mls/hr


   Documented by: 


Oxytocin/Lactated Ringer's (Pitocin In Lr 10 Units/1,000 Ml)  10 unit in 1,000 

mls @ 500 mls/hr IV .CONTINUOUS HEAVEN


Lactated Ringer's (Ringers, Lactated)  1,000 mls @ 100 mls/hr IV ASDIRECTED HEAVEN


   Last Admin: 20 16:10 Dose:  999 mls/hr


   Documented by: 


Lidocaine HCl (Xylocaine 1%)  50 ml INJECT ONETIME ONE


   Stop: 20 19:17


   Last Admin: 20 17:19 Dose:  Not Given


   Documented by: 


Misoprostol (Cytotec) Confirm Administered Dose 600 mcg .ROUTE .STK-MED ONE


   Stop: 20 16:05


   Last Admin: 20 17:19 Dose:  Not Given


   Documented by: 


Misoprostol (Cytotec)  600 mcg PO NOW STA


   Stop: 20 16:29


   Last Admin: 20 16:05 Dose:  600 mcg


   Documented by: 


Nalbuphine HCl (Nubain)  10 mg IVPUSH Q2H PRN


   PRN Reason: Pain


   Last Admin: 20 00:11 Dose:  10 mg


   Documented by: 


Ondansetron HCl (Zofran)  4 mg IVPUSH Q4H PRN


   PRN Reason: Nausea/Vomiting


Sodium Chloride (Saline Flush)  10 ml FLUSH ASDIRECTED PRN


   PRN Reason: Keep Vein Open

## 2020-07-18 NOTE — [id number]
Post Anesthesia Note





- EVALUATION WITHIN 48HRS OF ANESTHETIC


Vital Signs in Normal Range: Yes


Patient Participated in Evaluation: Yes


Respiratory Function Stable: Yes


Airway Patent: Yes


Cardiovascular Function Stable: Yes


Hydration Status Stable: Yes


Pain Control Satisfactory: Yes


Nausea and Vomiting Control Satisfactory: Yes


Mental Status Recovered: Yes


Vital Signs: 


                                Last Vital Signs











Temp  37.2 C   07/17/20 20:09


 


Pulse  84   07/17/20 20:09


 


Resp  16   07/17/20 20:09


 


BP  148/82 H  07/17/20 20:09


 


Pulse Ox  97   07/17/20 20:09














- COMMENTS/OBSERVATIONS


Free Text/Narrative:: 





no anesthesia complications noted

## 2022-06-30 ENCOUNTER — HOSPITAL ENCOUNTER (INPATIENT)
Dept: HOSPITAL 41 - JD.OB | Age: 27
LOS: 1 days | Discharge: HOME | DRG: 560 | End: 2022-07-01
Attending: OBSTETRICS & GYNECOLOGY | Admitting: OBSTETRICS & GYNECOLOGY
Payer: COMMERCIAL

## 2022-06-30 DIAGNOSIS — Z3A.38: ICD-10-CM

## 2022-06-30 LAB — EGFRCR SERPLBLD CKD-EPI 2021: 121 ML/MIN (ref 60–?)

## 2022-06-30 PROCEDURE — 10907ZC DRAINAGE OF AMNIOTIC FLUID, THERAPEUTIC FROM PRODUCTS OF CONCEPTION, VIA NATURAL OR ARTIFICIAL OPENING: ICD-10-PCS | Performed by: OBSTETRICS & GYNECOLOGY

## 2022-06-30 PROCEDURE — 3E0R3BZ INTRODUCTION OF ANESTHETIC AGENT INTO SPINAL CANAL, PERCUTANEOUS APPROACH: ICD-10-PCS | Performed by: OBSTETRICS & GYNECOLOGY

## 2022-06-30 PROCEDURE — 3E033VJ INTRODUCTION OF OTHER HORMONE INTO PERIPHERAL VEIN, PERCUTANEOUS APPROACH: ICD-10-PCS | Performed by: OBSTETRICS & GYNECOLOGY

## 2022-06-30 PROCEDURE — 00HU33Z INSERTION OF INFUSION DEVICE INTO SPINAL CANAL, PERCUTANEOUS APPROACH: ICD-10-PCS | Performed by: OBSTETRICS & GYNECOLOGY
